# Patient Record
(demographics unavailable — no encounter records)

---

## 2024-10-08 NOTE — HISTORY OF PRESENT ILLNESS
[de-identified] : 35 y/o M here to establish care and for CPE.   Relevant PMH: HTN diagnosed around age 29 and on Losartan 100mg for the past 5 yrs. Hx of HLD  and on rosuvastatin 10mg also for the past 5 yrs. He reports his diastolic BP was high in the 90-100s which why he is on losartan. His father  suddenly at age 44. His Mom has HTN. He reports his prev. A1c was 5.9.   He would like to be referred to urology for vasectomy as has 3 kids and no longer wants to have any more kids.   He is requesting refills of losartan and statin.   At baseline no CP or dyspnea at rest or exertion.   He does report having a colonoscopy done due to rectal bleeding which turned out to be just hemorrhoids.

## 2024-10-08 NOTE — PLAN
[FreeTextEntry1] : #CPE -CPE labs -STI and Hepatitis screen  #HLD -check lipid panel -refill statin  #HTN -check CMP  -refill ARB  #Vasectomy eval - referral  F/U: Next CPE or as needed.

## 2024-10-08 NOTE — HEALTH RISK ASSESSMENT
[Little interest or pleasure doing things] : 1) Little interest or pleasure doing things [Feeling down, depressed, or hopeless] : 2) Feeling down, depressed, or hopeless [PHQ-2 Negative - No further assessment needed] : PHQ-2 Negative - No further assessment needed [Fully functional (bathing, dressing, toileting, transferring, walking, feeding)] : Fully functional (bathing, dressing, toileting, transferring, walking, feeding) [Fully functional (using the telephone, shopping, preparing meals, housekeeping, doing laundry, using] : Fully functional and needs no help or supervision to perform IADLs (using the telephone, shopping, preparing meals, housekeeping, doing laundry, using transportation, managing medications and managing finances) [Never] : Never [Good] : ~his/her~  mood as  good [Yes] : Yes [Monthly or less (1 pt)] : Monthly or less (1 point) [1 or 2 (0 pts)] : 1 or 2 (0 points) [Never (0 pts)] : Never (0 points) [No] : In the past 12 months have you used drugs other than those required for medical reasons? No [No falls in past year] : Patient reported no falls in the past year [0] : 2) Feeling down, depressed, or hopeless: Not at all (0) [Audit-CScore] : 1 [[id number]] : 0 [Patient reported colonoscopy was normal] : Patient reported colonoscopy was normal [HIV Test offered] : HIV Test offered [Hepatitis C test offered] : Hepatitis C test offered [ColonoscopyComments] : Hx of Hemorrhoids  [NO] : No [TextEntry] : Meds: Vitamin C Allergies: no allergies Hospitalizations: 6 months ago had PNA, went to ER - no hospitalizations, saw pulmonary as f/u but now okay  Surgical Hx: No prior surgeries  Family Hx: Father with sudden death age 44  Mom HTN  Cancer Hx: Mom's brother had gastric cancer and liver cancer Social Hx: Smoking: Never Alcohol: occasional  Recreational Drugs: None Sexual Hx: Currently sexually active, no prior STIs Diet: No dietary restrictions,  Exercise: pel[place] once a week 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

## 2024-10-08 NOTE — PHYSICAL EXAM
[Normal Sclera/Conjunctiva] : normal sclera/conjunctiva [Normal Oropharynx] : the oropharynx was normal [No Lymphadenopathy] : no lymphadenopathy [Supple] : supple [Thyroid Normal, No Nodules] : the thyroid was normal and there were no nodules present [No Edema] : there was no peripheral edema [Soft] : abdomen soft [Non Tender] : non-tender [Non-distended] : non-distended [Normal Gait] : normal gait [Alert and Oriented x3] : oriented to person, place, and time [Normal] : affect was normal and insight and judgment were intact

## 2024-10-29 NOTE — PHYSICAL EXAM
[Normal Appearance] : normal appearance [Well Groomed] : well groomed [General Appearance - In No Acute Distress] : no acute distress [Edema] : no peripheral edema [Respiration, Rhythm And Depth] : normal respiratory rhythm and effort [Exaggerated Use Of Accessory Muscles For Inspiration] : no accessory muscle use [Abdomen Soft] : soft [Abdomen Tenderness] : non-tender [Costovertebral Angle Tenderness] : no ~M costovertebral angle tenderness [Urinary Bladder Findings] : the bladder was normal on palpation [Scrotum] : the scrotum was normal [Epididymis] : the epididymides were normal [Testes Tenderness] : no tenderness of the testes [Testes Mass (___cm)] : there were no testicular masses [Normal Station and Gait] : the gait and station were normal for the patient's age [] : no rash [No Focal Deficits] : no focal deficits [Oriented To Time, Place, And Person] : oriented to person, place, and time [Affect] : the affect was normal [Mood] : the mood was normal [No Palpable Adenopathy] : no palpable adenopathy [Chaperone Present] : A chaperone was present in the examining room during all aspects of the physical examination [de-identified] : Palpable vas deferens bilaterally [FreeTextEntry2] : Elsy Concepcion NP

## 2024-10-29 NOTE — HISTORY OF PRESENT ILLNESS
[FreeTextEntry1] : Mr. TIRADO is a 34 year-old Unknown  M who comes today to clinic for vasectomy evaluation. Has 3 children. Seeking sterility. Not on anticoagulation, plavix or aspirin. Denies testicular pain, dysuria, fever, chills.

## 2024-10-29 NOTE — ASSESSMENT
[FreeTextEntry1] : Mr. TIRADO is a 34 year-old Unknown  M who comes today to clinic for vasectomy evaluation.   The patient understands that vasectomy is a definitive form of contraception. Though technically reversible, a vasectomy should be considered permanent as success and pregnancy rates after vasectomy reversals are not great. The patient understands the need for interim contraception for a minimum of three months and semen analysis prior to assuming sterility. Furthermore, even after vasal occlusion is confirmed, vasectomy is not 100 percent reliable in preventing pregnancy. The risk of pregnancy after vasectomy is approximately 1 in 2000. Finally, the need for ongoing use of condoms to protect against sexually transmitted infections if not in a committed monogamous relationship.   The patient understands the risks of vasectomy including but not limited to bleeding, hematoma, infection, chronic testicular pain.   Based on our discussion the patient decided to proceed with vasectomy.     POST-OP CARE   The dressing and scrotal support are maintained for at least 48 hours after surgery. An ice pack intermittently applied to the scrotum for 24 to 48 hours also helps decrease discomfort and swelling.   Postprocedure pain varies with the technique and surgeon experience and can occur in up to 30 percent of patients but is usually self-limited. Acetaminophen or ibuprofen usually provides sufficient analgesia, although occasionally narcotic analgesics are necessary.   Postoperative instructions should be reviewed with the patient. Mild pain, swelling, and bruising are normal for the first two to three days. The patient should call for increasing pain, bleeding from the incision site, fever, or significant scrotal swelling.   Bed rest or gentle activity is recommended for the first 24 hours following a vasectomy. The patient may return to light work in two to three days but should refrain from heavy work, sports, or lifting for one week.   Sexual activity is avoided for one week. The patient and his partner are reminded to use an alternate method of contraception until semen analysis has confirmed absence of sperm (azoospermia) in the ejaculate. Since sperm are stored along the entire vas deferens and in the seminal vesicle, interruption of the vas does not result in immediate sterility, and several ejaculations are required to evacuate all of the sperm.     FOLLOW-UP TO CONFIRM STERILITY   We will obtain a semen analysis three months postoperatively; the patient should have had at least 20 ejaculates since the time of vasectomy. Azoospermia in a semen sample 3 months after vasectomy is definitive evidence of infertility   If there are motile sperm at the three-month check-up, a follow-up test is performed in another one to two months. Vasectomy is considered a failure if motile sperm are confirmed on the follow-up examination, there have been a sufficient number of ejaculations (>20), and >3 months have elapsed since the procedure. If vasectomy is considered a failure, he will need to use an alternative contraception and potentially undergo a repeat procedure.

## 2024-12-09 NOTE — HISTORY OF PRESENT ILLNESS
[FreeTextEntry1] : Mr. TIRADO is a 34 year-old Unknown  M who comes today to clinic for vasectomy evaluation. Has 3 children. Seeking sterility. Not on anticoagulation, plavix or aspirin.  12/9/2024 Status post vasectomy 12/6/2024.  Asymptomatic. Denies testicular pain, dysuria, fever, chills.

## 2024-12-09 NOTE — PHYSICAL EXAM
[Normal Appearance] : normal appearance [Well Groomed] : well groomed [General Appearance - In No Acute Distress] : no acute distress [Edema] : no peripheral edema [Respiration, Rhythm And Depth] : normal respiratory rhythm and effort [Exaggerated Use Of Accessory Muscles For Inspiration] : no accessory muscle use [Abdomen Soft] : soft [Abdomen Tenderness] : non-tender [Costovertebral Angle Tenderness] : no ~M costovertebral angle tenderness [Urinary Bladder Findings] : the bladder was normal on palpation [Scrotum] : the scrotum was normal [Epididymis] : the epididymides were normal [Testes Tenderness] : no tenderness of the testes [Testes Mass (___cm)] : there were no testicular masses [Normal Station and Gait] : the gait and station were normal for the patient's age [] : no rash [No Focal Deficits] : no focal deficits [Oriented To Time, Place, And Person] : oriented to person, place, and time [Affect] : the affect was normal [Mood] : the mood was normal [No Palpable Adenopathy] : no palpable adenopathy [Chaperone Present] : A chaperone was present in the examining room during all aspects of the physical examination [de-identified] : Bilateral wounds CDI [FreeTextEntry2] : Lucila RITCHIE

## 2024-12-09 NOTE — ASSESSMENT
[FreeTextEntry1] : Mr. TIRADO is a 34 year-old Unknown  M Status post vasectomy 12/6/2024.  Asymptomatic.   POST-OP CARE   The dressing and scrotal support are maintained for at least 48 hours after surgery. An ice pack intermittently applied to the scrotum for 24 to 48 hours also helps decrease discomfort and swelling.   Postprocedure pain varies with the technique and surgeon experience and can occur in up to 30 percent of patients but is usually self-limited. Acetaminophen or ibuprofen usually provides sufficient analgesia, although occasionally narcotic analgesics are necessary.   Postoperative instructions should be reviewed with the patient. Mild pain, swelling, and bruising are normal for the first two to three days. The patient should call for increasing pain, bleeding from the incision site, fever, or significant scrotal swelling.   Bed rest or gentle activity is recommended for the first 24 hours following a vasectomy. The patient may return to light work in two to three days but should refrain from heavy work, sports, or lifting for one week.   Sexual activity is avoided for one week. The patient and his partner are reminded to use an alternate method of contraception until semen analysis has confirmed absence of sperm (azoospermia) in the ejaculate. Since sperm are stored along the entire vas deferens and in the seminal vesicle, interruption of the vas does not result in immediate sterility, and several ejaculations are required to evacuate all of the sperm.     FOLLOW-UP TO CONFIRM STERILITY   We will obtain a semen analysis three months postoperatively; the patient should have had at least 20 ejaculates since the time of vasectomy. Azoospermia in a semen sample 3 months after vasectomy is definitive evidence of infertility   If there are motile sperm at the three-month check-up, a follow-up test is performed in another one to two months. Vasectomy is considered a failure if motile sperm are confirmed on the follow-up examination, there have been a sufficient number of ejaculations (>20), and >3 months have elapsed since the procedure. If vasectomy is considered a failure, he will need to use an alternative contraception and potentially undergo a repeat procedure.

## 2024-12-12 NOTE — HISTORY OF PRESENT ILLNESS
[FreeTextEntry1] : Mr. TIRADO is a 34 year-old Unknown  M who comes today to clinic for vasectomy evaluation. Has 3 children. Seeking sterility. Not on anticoagulation, plavix or aspirin.  12/12/2024 Status post vasectomy 12/6/2024.  Consulting for bilateral testicular pain. Denies dysuria, fever, chills.

## 2024-12-12 NOTE — PHYSICAL EXAM
[Normal Appearance] : normal appearance [Well Groomed] : well groomed [General Appearance - In No Acute Distress] : no acute distress [Edema] : no peripheral edema [Respiration, Rhythm And Depth] : normal respiratory rhythm and effort [Exaggerated Use Of Accessory Muscles For Inspiration] : no accessory muscle use [Abdomen Soft] : soft [Abdomen Tenderness] : non-tender [Costovertebral Angle Tenderness] : no ~M costovertebral angle tenderness [Urinary Bladder Findings] : the bladder was normal on palpation [Scrotum] : the scrotum was normal [Epididymis] : the epididymides were normal [Normal Station and Gait] : the gait and station were normal for the patient's age [] : no rash [No Focal Deficits] : no focal deficits [Oriented To Time, Place, And Person] : oriented to person, place, and time [Affect] : the affect was normal [Mood] : the mood was normal [No Palpable Adenopathy] : no palpable adenopathy [Chaperone Present] : A chaperone was present in the examining room during all aspects of the physical examination [de-identified] : Bilateral wounds CDI, right orchitis. [FreeTextEntry2] : Lucila RITCHIE

## 2024-12-12 NOTE — ASSESSMENT
[FreeTextEntry1] : Mr. TIRADO is a 34 year-old Unknown  M Status post vasectomy 12/6/2024.  Presenting right epididymitis.  Discussed need for NSAID and antibiotic therapy.  Patient agrees with this plan.     FOLLOW-UP TO CONFIRM STERILITY   We will obtain a semen analysis three months postoperatively; the patient should have had at least 20 ejaculates since the time of vasectomy. Azoospermia in a semen sample 3 months after vasectomy is definitive evidence of infertility   If there are motile sperm at the three-month check-up, a follow-up test is performed in another one to two months. Vasectomy is considered a failure if motile sperm are confirmed on the follow-up examination, there have been a sufficient number of ejaculations (>20), and >3 months have elapsed since the procedure. If vasectomy is considered a failure, he will need to use an alternative contraception and potentially undergo a repeat procedure.

## 2024-12-27 NOTE — PHYSICAL EXAM
[Normal Appearance] : normal appearance [Well Groomed] : well groomed [General Appearance - In No Acute Distress] : no acute distress [] : no respiratory distress [Respiration, Rhythm And Depth] : normal respiratory rhythm and effort [Exaggerated Use Of Accessory Muscles For Inspiration] : no accessory muscle use [Abdomen Tenderness] : non-tender [Abdomen Soft] : soft [Costovertebral Angle Tenderness] : no ~M costovertebral angle tenderness [Urinary Bladder Findings] : the bladder was normal on palpation [Scrotum] : the scrotum was normal [Epididymis] : the epididymides were normal [Normal Station and Gait] : the gait and station were normal for the patient's age [No Focal Deficits] : no focal deficits [Oriented To Time, Place, And Person] : oriented to person, place, and time [Affect] : the affect was normal [Mood] : the mood was normal [Chaperone Present] : A chaperone was present in the examining room during all aspects of the physical examination [Testes Tenderness] : no tenderness of the testes [Testes Mass (___cm)] : there were no testicular masses [de-identified] : Bilateral wounds CDI, normal testicles [FreeTextEntry2] : Yuliya RITCHIE

## 2024-12-27 NOTE — ASSESSMENT
[FreeTextEntry1] : Mr. TIRADO is a 34 year-old Unknown  M Status post vasectomy 12/6/2024.  Orchitis improved with antibiotic therapy.      FOLLOW-UP TO CONFIRM STERILITY   We will obtain a semen analysis three months postoperatively; the patient should have had at least 20 ejaculates since the time of vasectomy. Azoospermia in a semen sample 3 months after vasectomy is definitive evidence of infertility   If there are motile sperm at the three-month check-up, a follow-up test is performed in another one to two months. Vasectomy is considered a failure if motile sperm are confirmed on the follow-up examination, there have been a sufficient number of ejaculations (>20), and >3 months have elapsed since the procedure. If vasectomy is considered a failure, he will need to use an alternative contraception and potentially undergo a repeat procedure.

## 2024-12-27 NOTE — HISTORY OF PRESENT ILLNESS
[FreeTextEntry1] : Mr. TIRADO is a 34 year-old Unknown  M who comes today to clinic for vasectomy evaluation. Has 3 children. Seeking sterility. Not on anticoagulation, plavix or aspirin.  21/27/24 Status post vasectomy 12/6/2024.  Consulting for bilateral testicular pain which has improved with antibiotic therapy. Denies dysuria, fever, chills.

## 2025-07-01 NOTE — PLAN
[FreeTextEntry1] : #HTN Patient with good response in BP after weight loss. Rec to trial decreasing losartan dose to 50mg qday and keep home BP logs. Monitor for any readings > 140/90 as may warrant going back to 100mg dose. Provided 90 day supply w/ 1 month refill of 50mg losartan. Check CMP for kidney function and electrolytes.   #HLD Will check lipid panel, may be able to come off statin given weight loss  #Pre-DM Recheck A1c given weight loss.

## 2025-07-01 NOTE — PHYSICAL EXAM
[Alert and Oriented x3] : oriented to person, place, and time [Normal] : affect was normal and insight and judgment were intact No Yes

## 2025-07-01 NOTE — HISTORY OF PRESENT ILLNESS
[de-identified] : 34M here for a f/u for med refill.   Patient is taking Losartan 100mg for HTN, Rosuvastatin 10mg for HLD. He has lost about 24 lbs of weight over 6 months through diet and exercise. BP today is normotensive. BMI 25.8.   He would like to downtitrate meds if possible.